# Patient Record
Sex: FEMALE | Race: WHITE | Employment: UNEMPLOYED | URBAN - NONMETROPOLITAN AREA
[De-identification: names, ages, dates, MRNs, and addresses within clinical notes are randomized per-mention and may not be internally consistent; named-entity substitution may affect disease eponyms.]

---

## 2020-07-01 ENCOUNTER — HOSPITAL ENCOUNTER (INPATIENT)
Age: 60
LOS: 3 days | Discharge: HOME | DRG: 885 | End: 2020-07-04
Attending: PSYCHIATRY & NEUROLOGY | Admitting: PSYCHIATRY & NEUROLOGY
Payer: COMMERCIAL

## 2020-07-01 PROBLEM — F32.9 MAJOR DEPRESSIVE DISORDER, SINGLE EPISODE: Status: ACTIVE | Noted: 2020-07-01

## 2020-07-01 PROCEDURE — 1240000000 HC EMOTIONAL WELLNESS R&B

## 2020-07-01 RX ORDER — HYDROXYZINE HYDROCHLORIDE 25 MG/1
50 TABLET, FILM COATED ORAL 3 TIMES DAILY PRN
Status: DISCONTINUED | OUTPATIENT
Start: 2020-07-01 | End: 2020-07-04 | Stop reason: HOSPADM

## 2020-07-01 RX ORDER — ACETAMINOPHEN 325 MG/1
650 TABLET ORAL EVERY 4 HOURS PRN
Status: DISCONTINUED | OUTPATIENT
Start: 2020-07-01 | End: 2020-07-04 | Stop reason: HOSPADM

## 2020-07-01 RX ORDER — IBUPROFEN 400 MG/1
400 TABLET ORAL EVERY 6 HOURS PRN
Status: DISCONTINUED | OUTPATIENT
Start: 2020-07-01 | End: 2020-07-04 | Stop reason: HOSPADM

## 2020-07-01 RX ORDER — TRAZODONE HYDROCHLORIDE 50 MG/1
50 TABLET ORAL NIGHTLY PRN
Status: DISCONTINUED | OUTPATIENT
Start: 2020-07-02 | End: 2020-07-04 | Stop reason: HOSPADM

## 2020-07-01 RX ORDER — MAGNESIUM HYDROXIDE/ALUMINUM HYDROXICE/SIMETHICONE 120; 1200; 1200 MG/30ML; MG/30ML; MG/30ML
30 SUSPENSION ORAL EVERY 6 HOURS PRN
Status: DISCONTINUED | OUTPATIENT
Start: 2020-07-01 | End: 2020-07-04 | Stop reason: HOSPADM

## 2020-07-01 ASSESSMENT — PAIN SCALES - GENERAL: PAINLEVEL_OUTOF10: 0

## 2020-07-02 PROBLEM — F33.2 SEVERE EPISODE OF RECURRENT MAJOR DEPRESSIVE DISORDER, WITHOUT PSYCHOTIC FEATURES (HCC): Status: ACTIVE | Noted: 2020-07-01

## 2020-07-02 PROCEDURE — 6370000000 HC RX 637 (ALT 250 FOR IP): Performed by: PHYSICIAN ASSISTANT

## 2020-07-02 PROCEDURE — 1240000000 HC EMOTIONAL WELLNESS R&B

## 2020-07-02 PROCEDURE — 6370000000 HC RX 637 (ALT 250 FOR IP): Performed by: PSYCHIATRY & NEUROLOGY

## 2020-07-02 PROCEDURE — APPSS30 APP SPLIT SHARED TIME 16-30 MINUTES: Performed by: PHYSICIAN ASSISTANT

## 2020-07-02 PROCEDURE — 99223 1ST HOSP IP/OBS HIGH 75: CPT | Performed by: PSYCHIATRY & NEUROLOGY

## 2020-07-02 RX ORDER — ALPRAZOLAM 0.25 MG/1
1 TABLET ORAL 2 TIMES DAILY PRN
Status: ON HOLD | COMMUNITY
Start: 2020-06-01 | End: 2020-07-04 | Stop reason: HOSPADM

## 2020-07-02 RX ORDER — SERTRALINE HYDROCHLORIDE 100 MG/1
100 TABLET, FILM COATED ORAL DAILY
Status: DISCONTINUED | OUTPATIENT
Start: 2020-07-02 | End: 2020-07-04 | Stop reason: HOSPADM

## 2020-07-02 RX ORDER — SERTRALINE HYDROCHLORIDE 25 MG/1
1 TABLET, FILM COATED ORAL 2 TIMES DAILY
Status: ON HOLD | COMMUNITY
Start: 2020-06-01 | End: 2020-07-04 | Stop reason: HOSPADM

## 2020-07-02 RX ORDER — CLONAZEPAM 0.5 MG/1
0.5 TABLET ORAL DAILY PRN
Status: ON HOLD | COMMUNITY
Start: 2020-06-16 | End: 2020-07-04 | Stop reason: HOSPADM

## 2020-07-02 RX ORDER — ZOLPIDEM TARTRATE 10 MG/1
10 TABLET ORAL NIGHTLY
Status: DISCONTINUED | OUTPATIENT
Start: 2020-07-02 | End: 2020-07-04 | Stop reason: HOSPADM

## 2020-07-02 RX ADMIN — HYDROXYZINE HYDROCHLORIDE 50 MG: 25 TABLET ORAL at 10:35

## 2020-07-02 RX ADMIN — ZOLPIDEM TARTRATE 10 MG: 10 TABLET ORAL at 21:43

## 2020-07-02 RX ADMIN — HYDROXYZINE HYDROCHLORIDE 50 MG: 25 TABLET ORAL at 15:53

## 2020-07-02 RX ADMIN — SERTRALINE 100 MG: 100 TABLET, FILM COATED ORAL at 10:49

## 2020-07-02 ASSESSMENT — SLEEP AND FATIGUE QUESTIONNAIRES
DIFFICULTY ARISING: NO
DO YOU USE A SLEEP AID: NO
DIFFICULTY FALLING ASLEEP: YES
DO YOU HAVE DIFFICULTY SLEEPING: COMMENT
DO YOU USE A SLEEP AID: COMMENT
RESTFUL SLEEP: NO
DO YOU HAVE DIFFICULTY SLEEPING: YES
DIFFICULTY STAYING ASLEEP: YES

## 2020-07-02 ASSESSMENT — PAIN SCALES - GENERAL
PAINLEVEL_OUTOF10: 0
PAINLEVEL_OUTOF10: 0

## 2020-07-02 ASSESSMENT — LIFESTYLE VARIABLES: HISTORY_ALCOHOL_USE: NO

## 2020-07-02 ASSESSMENT — PATIENT HEALTH QUESTIONNAIRE - PHQ9: SUM OF ALL RESPONSES TO PHQ QUESTIONS 1-9: 6

## 2020-07-02 NOTE — BH NOTE
PLAN OF CARE:     Start Time: 0900  End Time:   0930    Group Topic:  Daily Goals    Group Type:   Goal Group    Intervention/Goal:  To increase support and identify daily goals    Attendance:   Attended group    Affect:   blunt    Behavior:  Cooperative but guarded    Response:   Identified a daily goal    Daily Goal:    Figure out what the plan is to get out of here.     Progress:   Progressing to goal

## 2020-07-02 NOTE — PLAN OF CARE
Problem: KNOWLEDGE DEFICIT,EDUCATION,DISCHARGE PLAN  Goal: Knowledge - personal safety  7/2/2020 1718 by Dasia Cobb LPN  Outcome: Met This Shift  1. Warning signs that happen when I am distressed or experience harmful thoughts. Nervousness, Anxiety, Panic  2. Activities that I can do to cope in a healthy way when I am stressed or distressed. Deep breaths, Calming thoughts, Yoga, [place]ks around [place]. 3. List of roadblocks that keep me from using healthy coping skills. Over thinking. 4. List of my support persons: Family and friends.      EMERGENCY CONTACTS:  -National suicide prevention life line 0-558-111-252-901-2814  -24 hour crisis line 7-858-HOPE (3808)  -Domestic violence hotline 4-161-913-SAFE (01 069 203)  -Test CONNECT to 716951 to chat with a trained crisis counselor 24 hours/day 7 days a week  -CALL 116

## 2020-07-02 NOTE — PATIENT CARE CONFERENCE
[facility]  Initial Interdisciplinary Treatment Plan NOTE    Review Date & Time: 7/2/20 1500    Patient was in treatment team.  See Multidisciplinary Treatment Team sheet for participants. Admission Type:   Admission Type: Involuntary    Reason for admission:  Reason for Admission: Patient reportedly had a knife to \"self harm\" and family had to take from her. Estimated Length of Stay Update:  3-5 days  Estimated Discharge Date Update: 3-5 days    PATIENT STRENGTHS:  Patient Strengths Strengths: Positive Support, No significant Physical Illness  Patient Strengths and Limitations:Limitations: Difficulty problem solving/relies on others to help solve problems, General negative or hopeless attitude about future/recovery  Addictive Behavior:   Medical Problems:  Past Medical History:   Diagnosis Date    Anxiety     Depression     Psychiatric problem        EDUCATION:   Learner Progress Toward Treatment Goals: Reviewed results and recommendations of this team, Reviewed group plan and strategies, Reviewed signs, symptoms and risk of self harm and violent behavior and Reviewed goals and plan of care    Method: Individual    Outcome: Verbalized understanding and Needs reinforcement    PATIENT GOALS: Improve Mood, Medication Management    PLAN/TREATMENT RECOMMENDATIONS UPDATE:   1. What is the most important thing we can help you with while you are here? - See above   2. Who is your support system?  -   3. Do you have follow-up providers? Mando Corbett (counseling), PCP (medicaiton management)  4. Do you have the ability to pay for your medications? - Yes  5. Where will you be residing when you leave the hospital?  - Home with   6. Will need a return to work slip or FMLA paper completion?   - Yes (note)      GOALS UPDATE:   Time frame for Short-Term Goals: Daily     EDWARD Stout

## 2020-07-02 NOTE — H&P
Department of Psychiatry  Psychiatric Assessment     CHIEF COMPLAINT:  Suicidal ideation    HISTORY OF PRESENT ILLNESS:      Denise Mishra is a 62 y.o. female with a history of depression who was a direct admission from [facility] for suicidal ideation. Per 4E nursing staff, patient reportedly had to put her mother in a nursing home recently and is feeling guilty and depressed. It was reported that patient states that \"there is evil in her for putting her mother in a nursing home. \"  had reported that patient has not been sleeping. The police were called today for patient having a knife. Family took knife away, patient had said she was only going to cut herself and was not suicidal. Patient was restrained while at that hospital and had Ativan and was too lethargic to complete most of the admission process, although she was able to deny any suicidal or homicidal thoughts. Patient also unsteady and unable to keep eyes open. She states she is a little confused today on how she got to the hospital. She states \"I had an impulsive moment and tried to cut myself. I was depressed. I was feeling overwhelmed. Looking through some stressful times and wasn't sure I was hitting deadlines. \" She states she has a lot of guilt towards putting her mother in the nursing home a few months ago. She reports yesterday she was really stressed that she waited too long and needed to get everything processed because her father is old and ill. She was feeling very inadequate as a daughter. She states this episode of depression \"started up right around [facility]. Its been an ongoing thing. Lots of hospital stays for my mother and not being able to see her. Her coming home and trying to figure out what was the best move for her because my dads health is rapidly declining. \" She has been feeling down and sad for more days than not. States she has not been sleeping well the past few weeks.  Is currently on Ambien for sleep and feels it is working well. She reports her appetite has been okay. Reports trouble with attention and concentration because she is consumed with getting her mother and father's things into order. She has been feeling hopeless and helpless. She has been feeling really anxious trying to manage work, home and parents. She currently sees her family doctor in [facility] for psychotropic medication management. Reports she did have a meeting set up with a psychiatrist in [facility] next week. She denies past suicide attempts and inpatient psychiatric admissions. She denies substance use. Sandy Mcgrath appears down and flat on examination. She is asking to be discharged. She states she needs to go home and take care of her dad. She denies current thoughts of harm to herself or others. Denies hallucinations. No evidence of delusions or overt psychosis on examination. I spoke to her  Bhaskar Quiñonez after obtaining the patient's verbal consent. Her  states it all started 2 months ago when her mom was sick and had to go to a nursing home to recover. He states her mom fell the first night in the nursing home and broke her hip. Her mom was in there with no visitation with the COVID thing and Sandy Mcgrath was stressed out that her mom was in there all alone. Sandy Mcgrath continued to stress about it for months if her mom could come home or not. He states they made a decision that there is no way she can be in the same house with her dad. He reports they spent all winter taking care of her parents who are 80 and 80. Her dad has COPD and keeps getting fluid in his lungs. He says that Sandy Mcgrath feels guilty that they put her mom in there and tricked her. After that, iIt snowballed to being stressed about everything. She's worried about paperwork, paying her parents bills. She was worried because the doctor let her off of work for a few weeks that she would have financial issues.  He states her siblings have really helped lately but in the long term limits  Appearance:  well-appearing, hospital attire, in chair, good grooming and good hygiene  Behavior/Motor:  no abnormalities noted  Attitude toward examiner:  cooperative, attentive and good eye contact  Speech:  spontaneous, normal rate and normal volume  Mood:  Dysthymic  Affect:  flat  Thought processes:  linear, goal directed and coherent  Thought content:  Denies homicidal ideation  Suicidal Ideation:  denies suicidal ideation  Delusions:  no evidence of delusions  Perceptual Disturbance:  denies any perceptual disturbance  Cognition: Patient is oriented to person, place, time and situation  Concentration: clinically adequate  Memory: intact  Insight & Judgement: fair         DSM-5 DIAGNOSIS:    Severe episode of recurrent major depressive disorder, without psychotic features  Generalized anxiety disorder    Patient Active Problem List   Diagnosis    Major depressive disorder, single episode          Psychosocial and Contextual Factors:     Familial    Past Medical History:   Diagnosis Date    Anxiety     Depression     Psychiatric problem           TREATMENT PLAN  Risk Management:  close watch per standard protocol  Psychotherapy:  participation in milieu and group and individual sessions with Attending Physician,  and Physician Assistant/CNP  Reason for Admission to Psychiatric Unit:  Threat to self  Estimated length of stay:  Greater than two midnights will be required to reach therapeutic levels of medications and to stabilize mood to where step down and management in a less restrictive environment is appropriate      GENERAL PATIENT/FAMILY EDUCATION  Patient will understand basic signs and symptoms, Patient will understand benefits/risks and potential side effects from proposed meds and Patient will understand their role in recovery. Family is  active in patient's care.    Patient assets that may be helpful during treatment include: Intent to participate and engage in treatment, sufficient fund of knowledge and intellect to understand and utilize treatments. Goals:    Resume home medications as prescribed  Increase Zoloft to 100mg daily  Encouraged patient to engage in groups, milieu, and individual therapies offered as part of programing. Behavioral Services  Medicare Certification Upon Admission    I certify that this patient's inpatient psychiatric hospital admission is medically necessary for:   X (1) Treatment which could reasonably be expected to improve this patient's condition,      X (2) Or for diagnostic study;     AND     X (2) The inpatient psychiatric services are provided while the individual is under the care of a physician and are included in the individualized plan of care. Estimated length of stay/service: Greater than two midnights will be required to reach therapeutic levels of medications and to stabilize mood    Plan for post-hospital care: Follow up with outpatient services    Claudette Benito is a 62 y.o. female being evaluated by a Virtual Visit (video visit) encounter to address concerns as mentioned above. A caregiver was present in the room along with the patient. Pursuant to the emergency declaration under the [address] [place] [address], [address] [place] [address] authority and the [employer] and [employer] Act, this Virtual Visit was conducted with patient's (and/or legal guardian's) consent, to reduce the patient's risk of exposure to COVID-19 and provide necessary medical care. Services were provided through a video synchronous discussion virtually to substitute for in-person visit by provider. Patient is present at [address] [place] [address] on unit 4E and I am physically present at my home in [address] on 7/2/2020 at 8:40 AM     An electronic signature was used to authenticate this note.                                     Psychiatry Attending Attestation     I assessed this patient and reviewed the case and plan of care with [facility] FRANDY WHITMAN. I have reviewed the above documentation and I agree with the findings and treatment plan with the following updates. Patient is a 71-year-old   female with admitted following an aborted suicide attempt a plan to kill self by cutting on her wrist with a knife. Patient's family had to wrestle knife out of her hand and brought her to the emergency department in [facility]. Patient reports she has been feeling down sad and low for more days now for last several months now. Endorses anhedonia. Endorses some inappropriate guilt regarding placing her mother who is dealing with dementia in a nursing home. Identifies stressors as caring for her sick parents. Reports poor energy and concentration. Reports having trouble falling asleep and staying asleep. Reports poor appetite. Denies any manic or hypomanic symptoms today. Denies any psychotic symptoms today. Agree with rest of the assessment and plan as above. Cherylene Scot is a 62 y.o. female being evaluated by a Virtual Visit (video visit) encounter to address concerns as mentioned above. A caregiver was present in the room along with the patient. Pursuant to the emergency declaration under the [facility][note 3][address] authority and the [employer] and [employer] General Act, this Virtual Visit was conducted with patient's (and/or legal guardian's) consent, to reduce the patient's risk of exposure to COVID-19 and provide necessary medical care. Services were provided through a video synchronous discussion virtually to substitute for in-person visit by provider. Patient is present at [address] [place] [address] on unit 4E and I am physically present at my home in [facility]     --Colletta Amis, MD on 7/2/2020 at 4:01 PM    An electronic signature was used to authenticate this note. **This report has been created using voice recognition software. It may contain minor errors which are inherent in voice recognition technology. **

## 2020-07-02 NOTE — GROUP NOTE
Group Therapy Note    Date: 7/2/2020    Group Start Time: 1100  Group End Time: 4394  Group Topic: Psychotherapy    [facility] Adult Psych 4E    COLETTE Upton        Group Therapy Note: Today group focused on the topic of support people and creating relationships that are supportive. Group member discussed what they look for in support people, what things they share and/or don't share with support people and boundaries. Attendees: 7          Patient's Goal:      Notes:  Patient was present throughout all of group and actively participated in discussion. Patient was shared that she has a support in her  and that she looks for people who are really there to listen to her. Talked about looking for people who uplift and are truthful. Status After Intervention:  Improved    Participation Level:  Active Listener and Interactive    Participation Quality: Appropriate and Supportive      Speech:  normal      Thought Process/Content: Logical      Affective Functioning: Congruent      Mood: euthymic      Level of consciousness:  Alert, Oriented x4 and Attentive      Response to Learning: Able to verbalize current knowledge/experience, Able to verbalize/acknowledge new learning and Able to retain information      Endings: None Reported    Modes of Intervention: Support, Exploration and Clarifying      Discipline Responsible: /Counselor      Signature:  COLETTE Bobby

## 2020-07-02 NOTE — PLAN OF CARE
Patient has attended at least 2 groups this shift and has been out of her room for most of the day so she has met her socialization goal for today.

## 2020-07-02 NOTE — PLAN OF CARE
Problem: Depressive Behavior With or Without Suicide Precautions:  Goal: Able to verbalize and/or display a decrease in depressive symptoms  Description: Able to verbalize and/or display a decrease in depressive symptoms  Outcome: Ongoing  Note: Patient reports mood 5/10 with 10 being normal. Has flat and blunt affect. Speech clear and relevant. Fair eye contact. Reports hope for future and identifies  as their support system. Goal: Ability to disclose and discuss suicidal ideas will improve  Description: Ability to disclose and discuss suicidal ideas will improve  Outcome: Ongoing  Note: Patient denies suicidal ideations, no plan or intent to harm self. Patient remains on suicidal precautions 15 checks for safety. Instructed to seek staff as needed for thoughts of self harm. Goal: Able to verbalize support systems  Description: Able to verbalize support systems  Outcome: Ongoing  Note: Patient reports  as their support system. Goal: Absence of self-harm  Description: Absence of self-harm  Outcome: Ongoing  Note: No self harm behaviors were observed or reported so far this shift. Remains on every 15 minutes precautions for safety. Goal: Patient specific goal  Description: Patient specific goal  Outcome: Ongoing  Note: Patient reports goal for today is to \"go home\". This was not met because patient not going home today. Goal: Participates in care planning  Description: Participates in care planning  Outcome: Ongoing  Note: Patient discussed treatment plan with physician/medical staff, attending group, and compliant with medications. Problem: Anxiety:  Goal: Level of anxiety will decrease  Description: Level of anxiety will decrease  Outcome: Ongoing  Note: Patient reports anxiety. Patient declined PRN medication. Patient aware if needed.        Problem: Discharge Planning:  Goal: Discharged to appropriate level of care  Description: Discharged to appropriate level of care  Outcome: Ongoing  Note: Patient voices no needs before discharge. Patient plans to be discharged to home with . Discharge planner working with patient to achieve optimal discharge plans, specific to individual needs. Problem: Activity:  Goal: Sleeping patterns will improve  Description: Sleeping patterns will improve  Outcome: Ongoing  Note: Patient slept 8.5 hours last night, reports they slept well. Encourage patient not to take naps during the day, relax several hours before bed and to take prescribed sleep meds as ordered. Patient verbalized understanding. Problem: KNOWLEDGE DEFICIT,EDUCATION,DISCHARGE PLAN  Goal: Knowledge - personal safety  Outcome: Ongoing  Note: Maintained in safe and secure environment. Patient did not fill out safety plan this shift. Care plan reviewed with patient. Patient verbalized understanding of the plan of care and contribute to goal setting.

## 2020-07-02 NOTE — PROGRESS NOTES
Contacted patient's counselor Klaudia Toribio to schedule follow up appointment, left message to return phone call.      EDWARD Arriaga

## 2020-07-02 NOTE — PROGRESS NOTES
[facility] Biopsychosocial Assessment    Current Level of Psychosocial Functioning     Independent XXX  Dependent    Minimal Assist     Comments:      Psychosocial High Risk Factors (check all that apply)    Unable to obtain meds   Chronic illness/pain    Substance abuse   Lack of Family Support   Financial stress   Isolation   Inadequate Community Resources  Suicide attempt(s)  Not taking medications   Victim of crime   Developmental Delay  Unable to manage personal needs    Age 72 or older   Homeless  No transportation   Readmission within 30 days  Unemployment  Traumatic Event XXX    Psychiatric Advanced Directive: None     Family to involve in treatment: , as needed    Sexual Orientation:  Heterosexual     Patient Strengths: Communication, Employed, Positive Support, Outpatient Provider    Patient Barriers: Situational Stressors, Impulsive    Opiate education provided: Not Indicated    Safety plan: On-Going    Plan of Care: Medication management, group/individual therapies, family meetings, psycho -education, treatment team meetings to assist with stabilization    Initial Discharge Plan: Patient plans to return to home with her  in [facility]. Clinical Summary: This is a 62year old, female who is admitted to  for increased depression and suicidal ideation. It is reported that PTA, police were called to the home for patient trying to cut herself with a  knife. Patient reports that she recently had to make the difficult decision to place her mother in a nursing home as she has dementia and is unable to be safely maintained in the community. Patient states she has had a stressful past few months dealing with her mother and father who both are in poor health, she has not been able to care for herself and the stress of COVID has taken a toll on her as well. Patient reports that she is linked with a counselor at [facility] [address] with Kelsey Freeman.  She has most recently been seeing her

## 2020-07-02 NOTE — PROGRESS NOTES
Behavioral Health   Admission Note     Admission Type:   Admission Type: Involuntary    Reason for admission:  Reason for Admission: Patient reportedly had a knife to \"self harm\" and family had to take from her. PATIENT STRENGTHS:  Strengths: Positive Support, No significant Physical Illness    Patient Strengths and Limitations:  Limitations: Difficulty problem solving/relies on others to help solve problems, General negative or hopeless attitude about future/recovery    Addictive Behavior:        Medical Problems:   Past Medical History:   Diagnosis Date    Anxiety     Depression     Psychiatric problem        Status EXAM:  Status and Exam  Normal: No  Facial Expression: Expressionless  Affect: Constricted  Level of Consciousness: Lethargic  Mood:Normal: (unable to assess due to medical condition)  Motor Activity:Normal: No  Motor Activity: Decreased  Interview Behavior: (unable to assess due to medical condition)  Preception: [place] to Person(answers to name, unable to assess due to medical condition)  Attention:Normal: (unable to assess due to medical condition)  Thought Processes: Other(See comment)(unable to assess due to medical condition)  Thought Content:Normal: (unable to assess due to medical condition)  Hallucinations: Unable to assess  Delusions: (unable to assess due to medical condition)  Memory:Normal: (unable to assess due to medical condition)  Insight and Judgment: (unable to assess due to medical condition)  Present Suicidal Ideation: No  Present Homicidal Ideation: No    Pt admitted with followings belongings:  Dentures: None  Vision - Corrective Lenses: Contacts, Glasses  Hearing Aid: None  Jewelry: None  Body Piercings Removed: N/A  Clothing: Socks, Undergarments (Comment), Footwear, Pants  Were All Patient Medications Collected?: Yes  Other Valuables: None     Admission order obtained yes. Valuables sent home with no one. Valuables placed in safe in security envelope, number:  n/a. Patient's home medications were locked in cabinet. Patient oriented to surroundings and program expectations and copy of patient rights given. Received admission packet:  no.  Consents reviewed, signed no. \"An Important Message from [employer] About Your Rights\" form reviewed, signed n/a. Refused no. Patient verbalize understanding:  no.    Patient education on precautions: yes           Patient screened positive for suicide risk on CSSR-S (\"yes\" to question #4, 5, OR 6)  no. Physician notified of risk score. Orders received n/a . Explained patients right to have family, representative or physician notified of their admission. Patient has Declined for physician to be notified. Patient has Declined for family/representative to be notified. Patient arrived via stretcher accompanied by EMTs and a . Patient reportedly had to put her mother in a nursing home recently and is feeling guilty and depressed. It was reported that patient states that \"there is evil in her for putting her mother in a nursing home. \"  had reported that patient has not been sleeping. The police wee called today for patient having a knife. Family took knife away, patient had said she was only going to cut herself and was not suicidal. Patient is a direct admission from [facility]. Patient was restrained while at that hospital and had Ativan and was too  lethargic to complete most of the admission process, although she was able to deny any suicidal or homicidal thoughts. Patient also unsteady and unable to keep eyes open.           Maribel Mc RN

## 2020-07-02 NOTE — BH NOTE
Group Therapy Note    Date: 7/2/2020  Start Time:  1000  End Time:    3481  Number of Participants: 8    Type of Group: Recreational/Music    Patient's Goal:   Increase socialization and relaxation. Notes:   Patient was in and out of group and did not participate. Patient was on the fringe.     Status After Intervention:  Unchanged    Participation Level: None    Participation Quality: Resistant      Speech:  mute      Thought Process/Content: guarded      Affective Functioning: Blunted      Mood: anxious and depressed      Level of consciousness:  Inattentive      Response to Learning: Resistant      Endings: None Reported    Modes of Intervention: Socialization and Activity      Discipline Responsible: Psychoeducational Specialist      Signature:  Raj Boles

## 2020-07-02 NOTE — BH NOTE
INPATIENT RECREATIONAL THERAPY  ADULT BEHAVIORAL SERVICES  EVALUATION    REFERRING PHYSICIAN:   Dr. Ashlyn Cárdenas  DIAGNOSIS:    Major Depression Disorder, Single Episode  PRECAUTIONS:   Standard precautions    HISTORY OF PRESENT ILLNESS/INJURY:   Patient was admitted to the unit due to suicidal ideation and depression. Patient reportedly had a knife and wanted to cut herself prior to admission. Patient stated that she has stress due to recently having to put her mother, who has dementia, in a nursing home. Patient reported that she believes that she \"is evil\" because of what she did. Patient reported anxiety, poor sleep and poor concentration. Patient was cooperative but guarded and dismissive at time of evaluation. Patient would like to be discharged. PMH:  Please see medical chart for prior medical history, allergies, and medication    HISTORY OF PSYCHIATRIC TREATMENT:  OP: PCP/Psychiatrist in [place] [address]   [birth year]  GENDER:   female  [address] 40:   with 2 children  EMPLOYMENT STATUS:  Employed as a . LIVING SITUATION/SUPPORT:  Lives with . Patient reported that her  is supportive. EDUCATIONAL LEVEL:    graduate    MEDICATION/DRUG USE:  Medication compliant. LEISURE INTERESTS:   Walking, family activities, spiritual activities, listening to music, watching TV/Movies  ACTIVITY PREFERENCE:   Small group  ACTIVITY TYPES:   Passive. Indoor. Outdoor. Active. COGNITION:  A&Ox3    COPING:   poor  ATTENTION:  poor  RELAXATION:   Patient appeared to have a lot of anxiety, poor sleep and poor concentration. SELF-ESTEEM:   poor  MOTIVATION:   poor    SOCIAL SKILLS:   poor  FRUSTRATION TOLERANCE:   No history of violence noted in chart.    ATTENTION SEEKING:  None noted  COOPERATION:  Cooperative but guarded  AFFECT:  blunt  APPEARANCE:  appropriate    HEARING:   No problems noted  VISION:  Corrected with glasses  VERBAL COMMUNICATION:

## 2020-07-03 PROCEDURE — 90833 PSYTX W PT W E/M 30 MIN: CPT | Performed by: PSYCHIATRY & NEUROLOGY

## 2020-07-03 PROCEDURE — 6370000000 HC RX 637 (ALT 250 FOR IP): Performed by: PHYSICIAN ASSISTANT

## 2020-07-03 PROCEDURE — 99232 SBSQ HOSP IP/OBS MODERATE 35: CPT | Performed by: PSYCHIATRY & NEUROLOGY

## 2020-07-03 PROCEDURE — APPSS30 APP SPLIT SHARED TIME 16-30 MINUTES: Performed by: PHYSICIAN ASSISTANT

## 2020-07-03 PROCEDURE — 1240000000 HC EMOTIONAL WELLNESS R&B

## 2020-07-03 RX ADMIN — ZOLPIDEM TARTRATE 10 MG: 10 TABLET ORAL at 21:38

## 2020-07-03 RX ADMIN — SERTRALINE 100 MG: 100 TABLET, FILM COATED ORAL at 08:04

## 2020-07-03 ASSESSMENT — PAIN SCALES - GENERAL
PAINLEVEL_OUTOF10: 0
PAINLEVEL_OUTOF10: 0

## 2020-07-03 NOTE — PROGRESS NOTES
Department of Psychiatry  Progress Note     Chief Complaint:  Severe episode of recurrent major depressive disorder, without psychotic features ([address]     SUBJECTIVE:    PROGRESS:  Estelita Carr states she is doing \"better\" this morning  She states she slept pretty well last night. She feels rested this morning. She denies current suicidal ideation  She continues to feel hopeless and helpless at times but those feelings are improving. She reports being on the unit has been insightful. \"Stepping away from everything and focusing on me. Letting me step back from the stress for a little bit and see. \" She states her  has been divvying out the responsibilities with her parents that she took over. She reports her siblings are stepping up to help her. Suicidal ideations: denies    Compliance with medications: good   Medication side effects: absent  ROS: Patient has new complaints:  no  Sleep quality: 8 hours broken verified  Attending groups: yes      OBJECTIVE      Medications  Current Facility-Administered Medications: sertraline (ZOLOFT) tablet 100 mg, 100 mg, Oral, Daily  zolpidem (AMBIEN) tablet 10 mg, 10 mg, Oral, Nightly  acetaminophen (TYLENOL) tablet 650 mg, 650 mg, Oral, Q4H PRN  ibuprofen (ADVIL;MOTRIN) tablet 400 mg, 400 mg, Oral, Q6H PRN  hydrOXYzine (ATARAX) tablet 50 mg, 50 mg, Oral, TID PRN  traZODone (DESYREL) tablet 50 mg, 50 mg, Oral, Nightly PRN  magnesium hydroxide (MILK OF MAGNESIA) 400 MG/5ML suspension 30 mL, 30 mL, Oral, Daily PRN  aluminum & magnesium hydroxide-simethicone (MAALOX) 200-200-20 MG/5ML suspension 30 mL, 30 mL, Oral, Q6H PRN     Physical     oral temperature is 96.7 °F (35.9 °C). Her blood pressure is 102/60 and her pulse is 78. Her respiration is 16 and oxygen saturation is 96%.    No results found for: WBC, HGB, HCT, PLT, CHOL, TRIG, HDL, LDLDIRECT, ALT, AST, NA, K, CL, CREATININE, BUN, CO2, TSH, PSA, INR, GLUF, LABA1C, LABMICR       Mental Status Exam:   Level of consciousness: within normal limits  Appearance:  well-appearing, street clothes and in chair  Behavior/Motor:  no abnormalities noted  Attitude toward examiner:  cooperative, attentive and good eye contact  Speech:  spontaneous, normal rate and normal volume  Mood:  \"Better\"  Affect:  blunted  Thought processes:  linear, goal directed and coherent  Thought content:  Denies homicidal ideation  Suicidal Ideation:  denies suicidal ideation  Delusions:  no evidence of delusions  Perceptual Disturbance:  denies any perceptual disturbance  Cognition: Patient is oriented to person, place time and situation  Concentration: clinically adequate  Memory: intact  Insight & Judgement: fair       ASSESSMENT     Severe episode of recurrent major depressive disorder, without psychotic features ([facility] [address]     PLAN    Patient's symptoms show improvement today  Continue current medications as prescribed  Attempt to develop insight, psycho-education and supportive therapy conducted. Probable discharge: 1-2 days  Follow-up: TBD Desiree Leventhal is a 62 y.o. female being evaluated by a Virtual Visit (video visit) encounter to address concerns as mentioned above. A caregiver was present in the room along with the patient. Pursuant to the emergency declaration under the [address] [place] [address], [address] [place] [address] authority and the [employer] and [employer] Act, this Virtual Visit was conducted with patient's (and/or legal guardian's) consent, to reduce the patient's risk of exposure to COVID-19 and provide necessary medical care. Services were provided through a video synchronous discussion virtually to substitute for in-person visit by provider. Patient is present at [address] [place] [address] on [address] and I am physically present at my home in [address] on 7/3/2020 at 7:42 AM     An electronic signature was used to authenticate this note. Psychiatry Attending Attestation     I assessed this patient and reviewed the case and [facility]. I have reviewed the above documentation and I agree with the findings and treatment plan with the following updates. Patient feels better than before. Mood and affect are better. Patient denies any suicidal ideation plan or intent. Denies any homicidal thoughts, that was explored with the patient. Oriented to time place and person. Recent and remote memory is intact. Patient feels hopeful. Sleep and appetite is good. No side effect from medication reported. Side-effect of medication were discussed with the patient . Patient is responding to current treatment. Discharge soon, if patient continues to show improvement. Case discussed with the staff. More than 16 mins of the session was spent doing Supportive psychotherapy. Session lasted for over 30 mins. Denise Mishra is a 62 y.o. female being evaluated by a Virtual Visit (video visit) encounter to address concerns as mentioned above. A caregiver was present in the room along with the patient. Pursuant to the emergency declaration under the [facility], [address] [place] [address] authority and the [employer] and [employer] Act, this Virtual Visit was conducted with patient's (and/or legal guardian's) consent, to reduce the patient's risk of exposure to COVID-19 and provide necessary medical care. Services were provided through a video synchronous discussion virtually to substitute for in-person visit by provider. Patient is present at [address] [place] [address] on [address] and I am physically present at my home in [facility]     --Varsha Mackay MD on 7/3/2020 at 7:28 PM    An electronic signature was used to authenticate this note. **This report has been created using voice recognition software.  It may contain minor errors which are inherent in voice recognition technology. **

## 2020-07-03 NOTE — PLAN OF CARE
Problem: Depressive Behavior With or Without Suicide Precautions:  Goal: Participates in care planning  Description: Participates in care planning  7/2/2020 2248 by Dewey Becerra RN  Outcome: Met This Shift  Note: Care plan reviewed with patient. Patient does verbalize understanding of the plan of care and does contribute to goal setting     7/2/2020 1018 by Robyn Aguilera RN  Outcome: Ongoing  Note: Patient discussed treatment plan with physician/medical staff, attending group, and compliant with medications. Problem: Anxiety:  Goal: Level of anxiety will decrease  Description: Level of anxiety will decrease  7/2/2020 2248 by Dewey Becerra RN  Outcome: Met This Shift  Note: Pt denied anxiety this evening  7/2/2020 1018 by Robyn Aguilera RN  Outcome: Ongoing  Note: Patient reports anxiety. Patient declined PRN medication. Patient aware if needed. Problem: Social interaction  Goal: Promoting Socialization  7/2/2020 1232 by Mirela Garcia  Outcome: Met This Shift     Problem: Depressive Behavior With or Without Suicide Precautions:  Goal: Able to verbalize and/or display a decrease in depressive symptoms  Description: Able to verbalize and/or display a decrease in depressive symptoms  7/2/2020 2248 by Dewey Becerra RN  Outcome: Ongoing  Note: Pt rates her mood as \"good\", states she was stressed and not sleeping well prior to admission, is worried about her 81 yo Dad that she helps care for and is wanting to get home as soon as possible, blunt affect, good eye contact, clean appearance in her own clothes, is hopeful, has good peer interaction  7/2/2020 1018 by Robyn Aguilera RN  Outcome: Ongoing  Note: Patient reports mood 5/10 with 10 being normal. Has flat and blunt affect. Speech clear and relevant. Fair eye contact. Reports hope for future and identifies  as their support system.       Goal: Patient specific goal  Description: Patient specific goal  7/2/2020 2248 by Bentley Soriano

## 2020-07-03 NOTE — BH NOTE
PLAN OF CARE:     Start Time: 0900  End Time:  0915    Group Topic:  Daily Goals    Group Type:   Goal Group    Intervention/Goal:  To increase support and identify daily goals    Attendance:  Participated in group     Affect:    blunt    Behavior:  Cooperative and pleasant    Response:    Identified a daily goal.     Daily Goal:     Go to groups.      Progress:      Goal met

## 2020-07-03 NOTE — GROUP NOTE
Group Therapy Note    Date: 7/3/2020    Group Start Time: 1400  Group End Time: 7972  Group Topic: Psychotherapy    [facility] Adult Psych 4E    EDWARD Alanis        Notes:  Patient present in group. Patient active in group discussion regarding coping skills, and participated in copings skills jenga. Patient was able to provide appropriate responses to questions asked by facilitator. Patient was pleasant and interacted appropriately with other group members. Able to provide goal oriented and solution focused responses and advice to others. Patient reported that she felt that this group was very therapeutic for her, she enjoyed listening to everyone share their thoughts and experiences which helped her gain \"perspective\" and \"much needed insight\"      Status After Intervention:  Improved    Participation Level:  Active Listener and Interactive    Participation Quality: Appropriate, Attentive, Sharing and Supportive      Speech:  normal      Thought Process/Content: Logical      Affective Functioning: Congruent      Mood: euthymic      Level of consciousness:  Alert, Oriented x4 and Attentive      Response to Learning: Able to verbalize current knowledge/experience, Able to verbalize/acknowledge new learning, Capable of insight and Progressing to goal      Endings: None Reported    Modes of Intervention: Education, Support, Socialization, Exploration, Clarifying, Problem-solving and Activity      Discipline Responsible: /Counselor      Signature:  EDWARD Alanis

## 2020-07-03 NOTE — BH NOTE
Group Therapy Note    Date: 7/3/2020  Start Time:    0930  End Time:     2107  Number of Participants:    8    Type of Group: Recreational/Social Group    Patient's Goal:    Increase socialization. Notes:   Social with others. Participated in making an ice cream sundae for the 4th of July.     Status After Intervention:  Improved    Participation Level: Interactive    Participation Quality: Appropriate, Attentive and Sharing      Speech:  hesitant      Thought Process/Content: Logical      Affective Functioning: Congruent      Mood: euthymic      Level of consciousness:  Oriented x4      Response to Learning: Progressing to goal      Endings: None Reported    Modes of Intervention: Socialization and Activity      Discipline Responsible: Psychoeducational Specialist      Signature:  Kayla Reynoso

## 2020-07-03 NOTE — PLAN OF CARE
treatment plan with physician/medical staff, attending group, and compliant with medications. 7/2/2020 2248 by Lidya Hernandez RN  Outcome: Met This Shift  Note: Care plan reviewed with patient. Patient does verbalize understanding of the plan of care and does contribute to goal setting      Problem: Anxiety:  Goal: Level of anxiety will decrease  Description: Level of anxiety will decrease  7/3/2020 1036 by Lukasz Petersen RN  Outcome: Ongoing  Note: Patient denies anxiety this shift. Patient aware of PRNs if needed for anxiety. 7/2/2020 2248 by Lidya Hernandez RN  Outcome: Met This Shift  Note: Pt denied anxiety this evening     Problem: Discharge Planning:  Goal: Discharged to appropriate level of care  Description: Discharged to appropriate level of care  7/3/2020 1036 by Lukasz Petersen RN  Outcome: Ongoing  Note: Patient voices no needs before discharge. Patient plans to be discharged to home with . Discharge planner working with patient to achieve optimal discharge plans, specific to individual needs. 7/2/2020 2248 by Lidya Hernandez RN  Outcome: Ongoing  Note: Pt plans to return home with her , states she plans to follow up with a psychiatrist in [facility]     Problem: Activity:  Goal: Sleeping patterns will improve  Description: Sleeping patterns will improve  7/3/2020 1036 by Lukasz Petersen RN  Outcome: Ongoing  Note: Patient slept 8 hours last night, reports they slept well. Encourage patient not to take naps during the day, relax several hours before bed and to take prescribed sleep meds as ordered. Patient verbalized understanding.      7/2/2020 2248 by Lidya Hernandez RN  Outcome: Ongoing  Note: Pt reports she has not been sleeping good, had scheduled ambien at bedtime and verbalized understanding to let nurse know if she isn't sleeping well and she can have trazodone if needed     Problem: KNOWLEDGE DEFICIT,EDUCATION,DISCHARGE PLAN  Goal: Knowledge - personal safety  7/3/2020 1036 by Tanya Lynn RN  Outcome: Ongoing  Note: Maintained in safe and secure environment. Patient did not fill out safety plan this shift. 7/2/2020 2248 by Jose Bryant RN  Outcome: Ongoing  Note: Pt did not work on safety plan this evening     Care plan reviewed with patient. Patient verbalized understanding of the plan of care and contribute to goal setting.

## 2020-07-03 NOTE — BH NOTE
Group Therapy Note    Date: 7/2/2020  Start Time: 2000  End Time:  2020    Type of Group: Wrap-Up and relaxation    Patient's Goal:  Get out of here    Notes:  Not met    Status After Intervention:  Unchanged    Participation Level:  Active Listener    Participation Quality: Attentive      Speech:  normal      Thought Process/Content: Logical      Affective Functioning: Blunted      Mood: depressed      Level of consciousness:  Alert      Response to Learning: Progressing to goal      Endings: None Reported    Modes of Intervention: Socialization      Discipline Responsible: Registered Nurse      Signature:  Jodee Reid RN

## 2020-07-03 NOTE — PATIENT CARE CONFERENCE
[facility]  Day 3 Interdisciplinary Treatment Plan NOTE    Review Date & Time: 7/3/20 8362    Patient was in treatment team    Admission Type:   Admission Type: Involuntary    Reason for admission:  Reason for Admission: Patient reportedly had a knife to \"self harm\" and family had to take from her.    Estimated Length of Stay Update:  3-5 days  Estimated Discharge Date Update: 3-5 days    PATIENT STRENGTHS:  Patient Strengths Strengths: Positive Support, No significant Physical Illness  Patient Strengths and Limitations:Limitations: Difficulty problem solving/relies on others to help solve problems, General negative or hopeless attitude about future/recovery  Addictive Behavior:Addictive Behavior  In the past 3 months, have you felt or has someone told you that you have a problem with:  : None  Do you have a history of Chemical Use?: No  Do you have a history of Alcohol Use?: No  Do you have a history of Street Drug Abuse?: No  Histroy of Prescripton Drug Abuse?: No  Medical Problems:  Past Medical History:   Diagnosis Date    Anxiety     Depression     Psychiatric problem        Risk:  Fall RiskTotal: 67  Romulo Scale Romulo Scale Score: 22  BVC Total: 0  Change in scores: No. Changes to plan of Care: No    Status EXAM:   Status and Exam  Normal: No  Facial Expression: Flat  Affect: Blunt  Level of Consciousness: Alert  Mood:Normal: No  Mood: Depressed, Anxious  Motor Activity:Normal: Yes  Motor Activity: Decreased  Interview Behavior: Cooperative  Preception: [place] to Person, [place] to Time, [place] to Place, [place] to Situation  Attention:Normal: Yes  Attention: Distractible  Thought Processes: Circumstantial  Thought Content:Normal: Yes  Hallucinations: None  Delusions: No  Memory:Normal: Yes  Insight and Judgment: No  Insight and Judgment: Poor Insight  Present Suicidal Ideation: No  Present Homicidal Ideation: No    Daily Assessment Last Entry:   Daily Sleep (WDL): Within Defined Limits Patient Currently in Pain: Denies  Daily Nutrition (WDL): Within Defined Limits    Patient Monitoring:  Frequency of Checks: 4 times per hour, close    Psychiatric Symptoms:   Depression Symptoms  Depression Symptoms: Change in energy level, Impaired concentration  Anxiety Symptoms  Anxiety Symptoms: Generalized  Galina Symptoms  Galina Symptoms: No problems reported or observed. Psychosis Symptoms  Delusion Type: No problems reported or observed. Suicide Risk CSSR-S:  1) Within the past month, have you wished you were dead or wished you could go to sleep and not wake up? : No  2) Have you actually had any thoughts of killing yourself? : No  6) Have you ever done anything, started to do anything, or prepared to do anything to end your life?: No  Change in Result: No Change in Plan of care: No      EDUCATION:   Learner Progress Toward Treatment Goals: Reviewed results and recommendations of this team, Reviewed group plan and strategies, Reviewed signs, symptoms and risk of self harm and violent behavior and Reviewed goals and plan of care    Method: Individual    Outcome: Verbalized understanding and Needs reinforcement    PATIENT GOALS: Improve mood, medication management, improve sleep, ability to utilize coping skills/self-care as needed in stressful situtations    PLAN/TREATMENT RECOMMENDATIONS UPDATE:    1. How are you progressing toward meeting your main treatment goal?-  - Improved, patient's insight has improved, self-awareness and needs improved. Ability to allow others to take responsibility of outside stressors and decision making has improved. 2.  Are there discharge barriers/lingering problems that need to be addressed?           - None at this time        3. Do you have the ability to pay for your medications? - Yes        4.   How is your group participation?                - Good, patient participates in group conversation appropriately, provides goal oriented and relevant

## 2020-07-04 VITALS
RESPIRATION RATE: 16 BRPM | HEART RATE: 77 BPM | DIASTOLIC BLOOD PRESSURE: 70 MMHG | TEMPERATURE: 96.5 F | OXYGEN SATURATION: 98 % | SYSTOLIC BLOOD PRESSURE: 113 MMHG

## 2020-07-04 PROCEDURE — 99239 HOSP IP/OBS DSCHRG MGMT >30: CPT | Performed by: PSYCHIATRY & NEUROLOGY

## 2020-07-04 PROCEDURE — 6370000000 HC RX 637 (ALT 250 FOR IP): Performed by: PHYSICIAN ASSISTANT

## 2020-07-04 PROCEDURE — 5130000000 HC BRIDGE APPOINTMENT

## 2020-07-04 RX ORDER — SERTRALINE HYDROCHLORIDE 100 MG/1
100 TABLET, FILM COATED ORAL DAILY
Qty: 30 TABLET | Refills: 0 | Status: SHIPPED | OUTPATIENT
Start: 2020-07-04

## 2020-07-04 RX ADMIN — SERTRALINE 100 MG: 100 TABLET, FILM COATED ORAL at 08:31

## 2020-07-04 ASSESSMENT — PAIN SCALES - GENERAL: PAINLEVEL_OUTOF10: 0

## 2020-07-04 NOTE — DISCHARGE SUMMARY
Provider Discharge Summary     Patient ID:  Renuka Salguero  [id number]  62 y.o.  1960    Admit date: 7/1/2020    Discharge date and time: 7/4/2020  7:52 AM     Admitting Physician: Kiko Burch MD     Discharge Physician: Kiko Burch MD    Admission Diagnoses: Major depressive disorder, single episode [F32.9]    Discharge Diagnoses:      Severe episode of recurrent major depressive disorder, without psychotic features [facility])     Patient Active Problem List   Diagnosis Code    Severe episode of recurrent major depressive disorder, without psychotic features ([facility] F33.2        Admission Condition: poor    Discharged Condition: stable    Indication for Admission: threat to self    History of Present Illnes (present tense wording is of findings from admission exam and are not necessarily indicative of current findings):   Renuka Salguero is a 62 y.o. female with a history of depression who was a direct admission from [facility] for suicidal ideation. Per 4E nursing staff, patient reportedly had to put her mother in a nursing home recently and is feeling guilty and depressed. It was reported that patient states that \"there is evil in her for putting her mother in a nursing home. \"  had reported that patient has not been sleeping. The police were called today for patient having a knife. Family took knife away, patient had said she was only going to cut herself and was not suicidal. Patient was restrained while at that hospital and had Ativan and was too lethargic to complete most of the admission process, although she was able to deny any suicidal or homicidal thoughts. Patient also unsteady and unable to keep eyes open.      She states she is a little confused today on how she got to the hospital. She states \"I had an impulsive moment and tried to cut myself. I was depressed. I was feeling overwhelmed.  Looking through some stressful times and wasn't sure I was hitting deadlines. \" She states she has a lot of guilt towards putting her mother in the nursing home a few months ago. She reports yesterday she was really stressed that she waited too long and needed to get everything processed because her father is old and ill. She was feeling very inadequate as a daughter. She states this episode of depression \"started up right around [facility]. Its been an ongoing thing. Lots of hospital stays for my mother and not being able to see her. Her coming home and trying to figure out what was the best move for her because my dads health is rapidly declining. \" She has been feeling down and sad for more days than not. States she has not been sleeping well the past few weeks. Is currently on Ambien for sleep and feels it is working well. She reports her appetite has been okay. Reports trouble with attention and concentration because she is consumed with getting her mother and father's things into order. She has been feeling hopeless and helpless. She has been feeling really anxious trying to manage work, home and parents.      She currently sees her family doctor in [facility] for psychotropic medication management. Reports she did have a meeting set up with a psychiatrist in [facility] next week. She denies past suicide attempts and inpatient psychiatric admissions. She denies substance use.      Jacob Carrington appears down and flat on examination. She is asking to be discharged. She states she needs to go home and take care of her dad. She denies current thoughts of harm to herself or others. Denies hallucinations. No evidence of delusions or overt psychosis on examination.      I spoke to her  Devante Mcmillan after obtaining the patient's verbal consent. Her  states it all started 2 months ago when her mom was sick and had to go to a nursing home to recover. He states her mom fell the first night in the nursing home and broke her hip.  Her mom was in there with no visitation with the COVID thing and Jacob Carrington Lizandro Malave  endorses feeling ready for discharge. Patient denies suicidal or homicidal ideations, denies hallucinations or delusions. Denies SE's from meds. It was decided that maximum benefit from hospital care had been achieved and patient can be discharged. Consults:   none    Significant Diagnostic Studies: Routine labs and diagnostics    Treatments: Psychotropic medications, therapy with group, milieu, and 1:1 with nurses, social workers, PA-C/CNP, and Attending physician. Discharge Medications:  Current Discharge Medication List      CONTINUE these medications which have CHANGED    Details   sertraline (ZOLOFT) 100 MG tablet Take 1 tablet by mouth daily  Qty: 30 tablet, Refills: 0         STOP taking these medications       ALPRAZolam (XANAX) 0.25 MG tablet Comments:   Reason for Stopping:         clonazePAM (KLONOPIN) 0.5 MG tablet Comments:   Reason for Stopping:                  Discharge Exam:  Level of consciousness:  Within normal limits  Appearance: Street clothes, seated, with good grooming  Behavior/Motor: No abnormalities noted  Attitude toward examiner:  Cooperative, attentive, good eye contact  Speech:  spontaneous, normal rate, normal volume and well articulated  Mood:  euthymic  Affect:  Full range  Thought processes:  linear, goal directed and coherent  Thought content:  denies homicidal ideation  Suicidal Ideation:  denies suicidal ideation  Delusions:  no evidence of delusions  Perceptual Disturbance:  denies any perceptual disturbance  Cognition:  Intact  Memory: age appropriate  Insight & Judgement: fair  Medication side effects: denies     Disposition: home    Patient Instructions: Activity: activity as tolerated  1. Patient instructed to take medications regularly and follow up with outpatient appointments.      Follow-up as scheduled with PCP/ Therapist        Signed:    Electronically signed by Alicia Freeman MD on 7/4/20 at 7:52 AM EDT    Time Spent on discharge is more than 35 minutes in the examination, evaluation, counseling and review of medications and discharge plan. Juan Drummond is a 62 y.o. female being evaluated by a Virtual Visit (video visit) encounter to address concerns as mentioned above. A caregiver was present in the room along with the patient. Pursuant to the emergency declaration under the [facility], [address] [place] [address] and the [employer] Act, this Virtual Visit was conducted with patient's (and/or legal guardian's) consent, to reduce the patient's risk of exposure to COVID-19 and provide necessary medical care. Services were provided through a video synchronous discussion virtually to substitute for in-person visit by provider. Patient is present at [address] [place] [address] on [address] and I am physically present at my [place] [facility] [place]     --Heriberto Oviedo MD on 7/4/2020 at 7:52 AM    An electronic signature was used to authenticate this note. **This report has been created using voice recognition software. It may contain minor errors which are inherent in voice recognition technology. **

## 2020-07-04 NOTE — PLAN OF CARE
Problem: Depressive Behavior With or Without Suicide Precautions:  Goal: Able to verbalize and/or display a decrease in depressive symptoms  Description: Able to verbalize and/or display a decrease in depressive symptoms  7/4/2020 0003 by Dionicio Garcia RN  Outcome: Met This Shift  Note: Reports mood is 10/10, blunt affect, does brightens some with interaction, good eye contact, is hopeful, has good peer interaction  7/3/2020 1036 by Michelle Womack RN  Outcome: Ongoing  Note: Patient reports mood 8/10 with 10 being normal. Has blunt but brightens affect. Speech clear and relevant. Good eye contact. Reports hope for future and identifies family as their support system. Goal: Patient specific goal  Description: Patient specific goal  7/4/2020 0003 by Dionicio Garcia RN  Outcome: Met This Shift  Note: Met goal of going to groups  7/3/2020 1036 by Michelle Womack RN  Outcome: Ongoing  Note: Patient reports goal for today is to \"go to groups\". This was met because patient did go to groups. Goal: Participates in care planning  Description: Participates in care planning  7/4/2020 0003 by Dionicio Garcia RN  Outcome: Met This Shift  Note: Care plan reviewed with patient. Patient does verbalize understanding of the plan of care and does contribute to goal setting     7/3/2020 1036 by Michelle Womack RN  Outcome: Ongoing  Note: Patient discussed treatment plan with physician/medical staff, attending group, and compliant with medications. Problem: Social interaction  Goal: Promoting Socialization  7/3/2020 1105 by Aleshia Disla  Outcome: Met This Shift     Problem: Anxiety:  Goal: Level of anxiety will decrease  Description: Level of anxiety will decrease  7/4/2020 0003 by Dionicio Garcia RN  Outcome: Ongoing  Note: Reports she feels a little anxious, refused offer of prn atarax  7/3/2020 1036 by Michelle Womack RN  Outcome: Ongoing  Note: Patient denies anxiety this shift.  Patient aware of PRNs if needed for anxiety. Problem: KNOWLEDGE DEFICIT,EDUCATION,DISCHARGE PLAN  Goal: Knowledge - personal safety  7/4/2020 0003 by Austin Dotson RN  Outcome: Ongoing  Note: Pt did not work on safety plan this evening  7/3/2020 1036 by Debarah Primrose, RN  Outcome: Ongoing  Note: Maintained in safe and secure environment. Patient did not fill out safety plan this shift. Problem: Discharge Planning:  Goal: Discharged to appropriate level of care  Description: Discharged to appropriate level of care  7/4/2020 0003 by Austin Dotson RN  Outcome: Ongoing  Note: Pt plans to be discharged home with her  tomorrow  7/3/2020 1036 by Debarah Primrose, RN  Outcome: Ongoing  Note: Patient voices no needs before discharge. Patient plans to be discharged to home with . Discharge planner working with patient to achieve optimal discharge plans, specific to individual needs. Problem: Activity:  Goal: Sleeping patterns will improve  Description: Sleeping patterns will improve  7/4/2020 0003 by Austin Dotson RN  Outcome: Completed  Note: Pt reports she is sleeping good, taking scheduled ambien  7/3/2020 1036 by Debarah Primrose, RN  Outcome: Ongoing  Note: Patient slept 8 hours last night, reports they slept well. Encourage patient not to take naps during the day, relax several hours before bed and to take prescribed sleep meds as ordered. Patient verbalized understanding.

## 2020-07-04 NOTE — BH NOTE
Behavioral Health   Discharge Note    Pt discharged with followings belongings:   Dentures: None  Vision - Corrective Lenses: Contacts, Glasses  Hearing Aid: None  Jewelry: None  Body Piercings Removed: N/A  Clothing: Socks, Undergarments (Comment), Footwear, Pants  Were All Patient Medications Collected?: Yes  Other Valuables: None   Valuables sent home with . Belongings and home medications  returned to patient. Patient left department with transport staff via ambulation. Discharged to home. \"An Important Message from [employer] About Your Rights\" form photocopy original from admission and provided to pt at discharge n/a. Patient education on aftercare instructions: yes  Bridge Appointment completed: Reviewed Discharge Instructions with patient. Patient verbalizes understanding and agreement with the discharge plan using the teachback method. Information faxed to follow up provider by staff Patient verbalize understanding of AVS:  yes.     Status EXAM upon discharge:  Status and Exam  Normal: No  Facial Expression: Flat, Worried  Affect: Blunt  Level of Consciousness: Alert  Mood:Normal: No  Mood: Anxious, Sad  Motor Activity:Normal: Yes  Motor Activity: Decreased  Interview Behavior: Cooperative  Preception: [place] to Person, Carylon Belkis to Time, [place] to Place, [place] to Situation  Attention:Normal: No  Attention: Unable to Concentrate  Thought Processes: Circumstantial  Thought Content:Normal: Yes  Hallucinations: None  Delusions: No  Memory:Normal: Yes  Insight and Judgment: No  Insight and Judgment: Poor Judgment, Poor Insight  Present Suicidal Ideation: No  Present Homicidal Ideation: No    Charo Thomason RN

## 2020-07-06 ENCOUNTER — TELEPHONE (OUTPATIENT)
Dept: PSYCHIATRY | Age: 60
End: 2020-07-06

## 2023-10-02 ENCOUNTER — NURSE ONLY (OUTPATIENT)
Age: 63
End: 2023-10-02

## 2023-10-02 LAB
ALBUMIN SERPL BCG-MCNC: 4.4 G/DL (ref 3.5–5.1)
ALP SERPL-CCNC: 80 U/L (ref 38–126)
ALT SERPL W/O P-5'-P-CCNC: 9 U/L (ref 11–66)
ANION GAP SERPL CALC-SCNC: 11 MEQ/L (ref 8–16)
AST SERPL-CCNC: 18 U/L (ref 5–40)
BASOPHILS ABSOLUTE: 0.1 THOU/MM3 (ref 0–0.1)
BASOPHILS NFR BLD AUTO: 1 %
BILIRUB SERPL-MCNC: 0.3 MG/DL (ref 0.3–1.2)
BUN SERPL-MCNC: 18 MG/DL (ref 7–22)
CALCIUM SERPL-MCNC: 9.6 MG/DL (ref 8.5–10.5)
CHLORIDE SERPL-SCNC: 103 MEQ/L (ref 98–111)
CHOLEST SERPL-MCNC: 233 MG/DL (ref 100–199)
CO2 SERPL-SCNC: 25 MEQ/L (ref 23–33)
CREAT SERPL-MCNC: 0.7 MG/DL (ref 0.4–1.2)
DEPRECATED MEAN GLUCOSE BLD GHB EST-ACNC: 114 MG/DL (ref 70–126)
DEPRECATED RDW RBC AUTO: 45.9 FL (ref 35–45)
EOSINOPHIL NFR BLD AUTO: 2.4 %
EOSINOPHILS ABSOLUTE: 0.1 THOU/MM3 (ref 0–0.4)
ERYTHROCYTE [DISTWIDTH] IN BLOOD BY AUTOMATED COUNT: 12.8 % (ref 11.5–14.5)
GFR SERPL CREATININE-BSD FRML MDRD: > 60 ML/MIN/1.73M2
GLUCOSE SERPL-MCNC: 98 MG/DL (ref 70–108)
HBA1C MFR BLD HPLC: 5.8 % (ref 4.4–6.4)
HCT VFR BLD AUTO: 39.6 % (ref 37–47)
HDLC SERPL-MCNC: 79 MG/DL
HGB BLD-MCNC: 13.1 GM/DL (ref 12–16)
IMM GRANULOCYTES # BLD AUTO: 0.01 THOU/MM3 (ref 0–0.07)
IMM GRANULOCYTES NFR BLD AUTO: 0.2 %
LDLC SERPL CALC-MCNC: 138 MG/DL
LYMPHOCYTES ABSOLUTE: 2.6 THOU/MM3 (ref 1–4.8)
LYMPHOCYTES NFR BLD AUTO: 44.6 %
MCH RBC QN AUTO: 32.3 PG (ref 26–33)
MCHC RBC AUTO-ENTMCNC: 33.1 GM/DL (ref 32.2–35.5)
MCV RBC AUTO: 97.5 FL (ref 81–99)
MONOCYTES ABSOLUTE: 0.5 THOU/MM3 (ref 0.4–1.3)
MONOCYTES NFR BLD AUTO: 7.8 %
NEUTROPHILS NFR BLD AUTO: 44 %
NRBC BLD AUTO-RTO: 0 /100 WBC
PLATELET # BLD AUTO: 228 THOU/MM3 (ref 130–400)
PMV BLD AUTO: 10.8 FL (ref 9.4–12.4)
POTASSIUM SERPL-SCNC: 4.4 MEQ/L (ref 3.5–5.2)
PROT SERPL-MCNC: 7.2 G/DL (ref 6.1–8)
RBC # BLD AUTO: 4.06 MILL/MM3 (ref 4.2–5.4)
SEGMENTED NEUTROPHILS ABSOLUTE COUNT: 2.6 THOU/MM3 (ref 1.8–7.7)
SODIUM SERPL-SCNC: 139 MEQ/L (ref 135–145)
TRIGL SERPL-MCNC: 78 MG/DL (ref 0–199)
TSH SERPL DL<=0.005 MIU/L-ACNC: 1.7 UIU/ML (ref 0.4–4.2)
WBC # BLD AUTO: 5.9 THOU/MM3 (ref 4.8–10.8)

## 2024-07-17 ENCOUNTER — LAB (OUTPATIENT)
Age: 64
End: 2024-07-17

## 2024-07-17 LAB
ALBUMIN SERPL BCG-MCNC: 4.5 G/DL (ref 3.5–5.1)
ALP SERPL-CCNC: 78 U/L (ref 38–126)
ALT SERPL W/O P-5'-P-CCNC: 10 U/L (ref 11–66)
ANION GAP SERPL CALC-SCNC: 11 MEQ/L (ref 8–16)
AST SERPL-CCNC: 19 U/L (ref 5–40)
BILIRUB SERPL-MCNC: 0.3 MG/DL (ref 0.3–1.2)
BUN SERPL-MCNC: 16 MG/DL (ref 7–22)
CALCIUM SERPL-MCNC: 9.4 MG/DL (ref 8.5–10.5)
CHLORIDE SERPL-SCNC: 106 MEQ/L (ref 98–111)
CHOLEST SERPL-MCNC: 237 MG/DL (ref 100–199)
CO2 SERPL-SCNC: 26 MEQ/L (ref 23–33)
CREAT SERPL-MCNC: 0.8 MG/DL (ref 0.4–1.2)
DEPRECATED MEAN GLUCOSE BLD GHB EST-ACNC: 105 MG/DL (ref 70–126)
GFR SERPL CREATININE-BSD FRML MDRD: 83 ML/MIN/1.73M2
GLUCOSE SERPL-MCNC: 96 MG/DL (ref 70–108)
HBA1C MFR BLD HPLC: 5.5 % (ref 4.4–6.4)
HDLC SERPL-MCNC: 75 MG/DL
LDLC SERPL CALC-MCNC: 141 MG/DL
POTASSIUM SERPL-SCNC: 4.4 MEQ/L (ref 3.5–5.2)
PROT SERPL-MCNC: 7 G/DL (ref 6.1–8)
SODIUM SERPL-SCNC: 143 MEQ/L (ref 135–145)
TRIGL SERPL-MCNC: 104 MG/DL (ref 0–199)